# Patient Record
Sex: FEMALE | Race: ASIAN | NOT HISPANIC OR LATINO | URBAN - METROPOLITAN AREA
[De-identification: names, ages, dates, MRNs, and addresses within clinical notes are randomized per-mention and may not be internally consistent; named-entity substitution may affect disease eponyms.]

---

## 2017-02-06 ENCOUNTER — EMERGENCY (EMERGENCY)
Facility: HOSPITAL | Age: 42
LOS: 1 days | Discharge: HOME | End: 2017-02-06
Attending: EMERGENCY MEDICINE
Payer: COMMERCIAL

## 2017-02-06 VITALS
OXYGEN SATURATION: 99 % | HEIGHT: 64 IN | DIASTOLIC BLOOD PRESSURE: 74 MMHG | SYSTOLIC BLOOD PRESSURE: 169 MMHG | TEMPERATURE: 98 F | WEIGHT: 167.99 LBS | RESPIRATION RATE: 18 BRPM | HEART RATE: 62 BPM

## 2017-02-06 DIAGNOSIS — R07.9 CHEST PAIN, UNSPECIFIED: ICD-10-CM

## 2017-02-06 LAB
ANION GAP SERPL CALC-SCNC: 9 MMOL/L — SIGNIFICANT CHANGE UP (ref 5–17)
BASOPHILS # BLD AUTO: 0.1 K/UL — SIGNIFICANT CHANGE UP (ref 0–0.2)
BASOPHILS NFR BLD AUTO: 1.2 % — SIGNIFICANT CHANGE UP (ref 0–2)
BUN SERPL-MCNC: 10 MG/DL — SIGNIFICANT CHANGE UP (ref 7–18)
CALCIUM SERPL-MCNC: 8.6 MG/DL — SIGNIFICANT CHANGE UP (ref 8.4–10.5)
CHLORIDE SERPL-SCNC: 106 MMOL/L — SIGNIFICANT CHANGE UP (ref 96–108)
CK MB CFR SERPL CALC: <1 NG/ML — SIGNIFICANT CHANGE UP (ref 0–3.6)
CO2 SERPL-SCNC: 25 MMOL/L — SIGNIFICANT CHANGE UP (ref 22–31)
CREAT SERPL-MCNC: 0.8 MG/DL — SIGNIFICANT CHANGE UP (ref 0.5–1.3)
EOSINOPHIL # BLD AUTO: 0.1 K/UL — SIGNIFICANT CHANGE UP (ref 0–0.5)
EOSINOPHIL NFR BLD AUTO: 1.7 % — SIGNIFICANT CHANGE UP (ref 0–6)
GLUCOSE SERPL-MCNC: 80 MG/DL — SIGNIFICANT CHANGE UP (ref 70–99)
HCT VFR BLD CALC: 44.6 % — SIGNIFICANT CHANGE UP (ref 34.5–45)
HGB BLD-MCNC: 14.9 G/DL — SIGNIFICANT CHANGE UP (ref 11.5–15.5)
LYMPHOCYTES # BLD AUTO: 2.1 K/UL — SIGNIFICANT CHANGE UP (ref 1–3.3)
LYMPHOCYTES # BLD AUTO: 33.1 % — SIGNIFICANT CHANGE UP (ref 13–44)
MCHC RBC-ENTMCNC: 29.4 PG — SIGNIFICANT CHANGE UP (ref 27–34)
MCHC RBC-ENTMCNC: 33.3 GM/DL — SIGNIFICANT CHANGE UP (ref 32–36)
MCV RBC AUTO: 88.1 FL — SIGNIFICANT CHANGE UP (ref 80–100)
MONOCYTES # BLD AUTO: 0.3 K/UL — SIGNIFICANT CHANGE UP (ref 0–0.9)
MONOCYTES NFR BLD AUTO: 5.2 % — SIGNIFICANT CHANGE UP (ref 2–14)
NEUTROPHILS # BLD AUTO: 3.8 K/UL — SIGNIFICANT CHANGE UP (ref 1.8–7.4)
NEUTROPHILS NFR BLD AUTO: 58.7 % — SIGNIFICANT CHANGE UP (ref 43–77)
PLATELET # BLD AUTO: 305 K/UL — SIGNIFICANT CHANGE UP (ref 150–400)
POTASSIUM SERPL-MCNC: 4.4 MMOL/L — SIGNIFICANT CHANGE UP (ref 3.5–5.3)
POTASSIUM SERPL-SCNC: 4.4 MMOL/L — SIGNIFICANT CHANGE UP (ref 3.5–5.3)
RBC # BLD: 5.06 M/UL — SIGNIFICANT CHANGE UP (ref 3.8–5.2)
RBC # FLD: 11 % — SIGNIFICANT CHANGE UP (ref 10.3–14.5)
SODIUM SERPL-SCNC: 140 MMOL/L — SIGNIFICANT CHANGE UP (ref 135–145)
TROPONIN I SERPL-MCNC: <0.015 NG/ML — SIGNIFICANT CHANGE UP (ref 0–0.04)
WBC # BLD: 6.4 K/UL — SIGNIFICANT CHANGE UP (ref 3.8–10.5)
WBC # FLD AUTO: 6.4 K/UL — SIGNIFICANT CHANGE UP (ref 3.8–10.5)

## 2017-02-06 PROCEDURE — 99285 EMERGENCY DEPT VISIT HI MDM: CPT

## 2017-02-06 PROCEDURE — 71020: CPT | Mod: 26

## 2017-02-06 NOTE — ED PROVIDER NOTE - NS ED MD SCRIBE ATTENDING SCRIBE SECTIONS
HIV/HISTORY OF PRESENT ILLNESS/REVIEW OF SYSTEMS/VITAL SIGNS( Pullset)/PHYSICAL EXAM/DISPOSITION/PAST MEDICAL/SURGICAL/SOCIAL HISTORY

## 2017-02-06 NOTE — ED PROVIDER NOTE - OBJECTIVE STATEMENT
42 y/o F pt w/ no known PMHx p/w intermittent chest pain (center of chest) x1 week, no exertional component, today radiating to right shoulder, no aggravating or relieving factors. Pt denies diaphoresis, nausea, vomiting, shortness of breath, or any other complaints.

## 2021-08-06 PROBLEM — Z00.00 ENCOUNTER FOR PREVENTIVE HEALTH EXAMINATION: Status: ACTIVE | Noted: 2021-08-06

## 2021-08-09 PROBLEM — Z80.0 FAMILY HISTORY OF MALIGNANT NEOPLASM OF COLON: Status: ACTIVE | Noted: 2021-08-09

## 2021-08-09 NOTE — OB HISTORY
[Total Preg ___] : : [unfilled] [Full Term ___] : [unfilled] (full-term) [AB Induced ___] : [unfilled] elective (s) [AB Spont ___] : [unfilled] miscarriage(s)

## 2021-08-13 ENCOUNTER — APPOINTMENT (OUTPATIENT)
Dept: GYNECOLOGIC ONCOLOGY | Facility: CLINIC | Age: 46
End: 2021-08-13
Payer: COMMERCIAL

## 2021-08-13 VITALS
DIASTOLIC BLOOD PRESSURE: 78 MMHG | HEART RATE: 76 BPM | HEIGHT: 64 IN | WEIGHT: 174 LBS | BODY MASS INDEX: 29.71 KG/M2 | SYSTOLIC BLOOD PRESSURE: 110 MMHG

## 2021-08-13 DIAGNOSIS — Z72.89 OTHER PROBLEMS RELATED TO LIFESTYLE: ICD-10-CM

## 2021-08-13 DIAGNOSIS — Z80.0 FAMILY HISTORY OF MALIGNANT NEOPLASM OF DIGESTIVE ORGANS: ICD-10-CM

## 2021-08-13 DIAGNOSIS — Z80.7 FAMILY HISTORY OF OTHER MALIGNANT NEOPLASMS OF LYMPHOID, HEMATOPOIETIC AND RELATED TISSUES: ICD-10-CM

## 2021-08-13 PROCEDURE — 99205 OFFICE O/P NEW HI 60 MIN: CPT

## 2021-08-13 RX ORDER — TELMISARTAN 80 MG/1
80 TABLET ORAL
Refills: 0 | Status: ACTIVE | COMMUNITY

## 2021-08-13 RX ORDER — HYDROCHLOROTHIAZIDE 25 MG/1
25 TABLET ORAL
Refills: 0 | Status: ACTIVE | COMMUNITY

## 2021-08-14 NOTE — LETTER BODY
[Dear  ___] : Dear  [unfilled], [FreeTextEntry2] : I had the pleasure of seeing Dionicio Sanon in consultation regarding enlargement of the uterus and adnexal masses. We discussed the differential and management options, including my advise for excision and possible staging. \par \par I have advised further imaging and plan further discussion following this study.\par \par She may wish to defer intervention until after the son of her wedding. \par \par I will keep you informed as to her findings and disposition.

## 2021-08-14 NOTE — PHYSICAL EXAM
[Normal] : Recto-Vaginal Exam: Normal [Fully active, able to carry on all pre-disease performance without restriction] : Status 0 - Fully active, able to carry on all pre-disease performance without restriction [Abnormal] : Bimanual Exam: Abnormal [de-identified] : trace edema [de-identified] : The uterus was enlarged to about 14+ weeks. nonpalpable [de-identified] : The adnexa were nonpalpable separate from the uterus [de-identified] : The uterus was enlarged

## 2021-08-14 NOTE — HISTORY OF PRESENT ILLNESS
[FreeTextEntry1] : Referring GYN - Dr. Mouna Quan\par PCP - Dr. Emmy Will\par Bariatric - Dr. Pablo Daly\par \par Ms. Hernandez is a 45 yo  perimenopausal female (LMP 21) who presents for initial consultation at the request of Dr. Quan for the management of adnexal masses. She was seen for GYN exam 3/8/21 at which time she reported abdominal bloating and LLQ pain. In office imaging revealed fibroids and was to repeat imaging in a few months. Repeat imaging revealed uterine fibroids and possible bilateral endometriomas. She was referred here for further management. \par \par Reports intermittent LLQ pain and dyspareunia. No menorrhagia. Tolerating PO without N/V. Denies a change in appetite or weight. Reports normal bowel and urinary function. No other associated signs or symptoms. \par \par IMAGING:\par Pelvic MRI on 21 ([facility]): AV uterus 10 x 5 x 6 cm. No endometrial thickening or pathologic fluid. No adenomyosis. Several uterine fibroids: 1.9 x 1.8 right anterolateral, 1.9 x 1.6 cm right anterior, 1.2 x 0.6cm posterior. Additional subcm intramural fibroids are noted. No endometriosis. Small nabothian cysts. \par RTO - mildly enlarged. A unilocular cyst 2.9 x 3.3 x 2.1 cm. There is a small focal mural nodule or nodular excrescences along the posterior wall possible related to a blood clot in the setting of hemorrhagic cyst or endometrioma. \par LTO - mildly enlarged 3.3 x 3.9 x 2.5cm unilocular left ovarian cyst with dependent fluid levels posteriorly. Questionable small nondependent 3mm mural nodule anteriorly - could be a hemorrhagic cyst or endometrioma. no LAD. Trace pelvic ascites. \par \par In office TVUS on 21: uterus 6.9 x 7.0 x 4.6cm. Fibroids - 2.5 x 2.1 x 1.6 cm. There is a 2.8 x 2.5 x 2.3cm. There is a 2.5 x 2.4 x 2.3cm. There is a 2.4 x 2.1 x 1.6cm. \par RTO - 3.0 x 3.0 x 2.6cm. Hemorrhagic cyst cannot R/O dermoid\par \par PMHx: HTN\par PSHx: gastric balloon, 2021 - 30 lb weight loss to date - will be removed 2022\par Fam Hx: paternal aunt (colon cancer), father (lymphoma)\par \par \par HCM:\par PAP on 3/8/21: NEGATIVE\par Mammogram: 2021\par SBE: performs\par COVID-19 vaccine series completed 2021, Pfizer

## 2021-08-14 NOTE — DISCUSSION/SUMMARY
[Reviewed Clinical Lab Test(s)] : Results of clinical tests were reviewed. [Reviewed Radiology Report(s)] : Radiology reports were reviewed. [FreeTextEntry1] : -I met with the pt.\par -We discussed the clinical findings and differential (ut and and findings). MR report disc in detail. She will drop off the CDs for uploading. \par -Management options were reviewed, including my advise for reviewing the MR and obtaining CT abd/chest to exclude extrapelvic disease. We disc the poss dispo - excision which I advise (hy, BS, poss ov cystectomy vs oophorectomy, possible staging). Favor open proc (vert inc). Disc the role of exp mgmt with short interval f/u (Sept (given her son's planned wedding in Oct 2021) with Liliana. \par -Periop and recuperative issues were discussed, including the hazards of surgery.\par -A diagram was drawn and a copy was provided. \par -I advised a clearance.\par -I advised a CT abd/chest. Rx's provided. Await Auth. \par -We disc the role of a  and that I did not feel it would alter our plan. We disc factors that are assoc with its elevation and the challenges of interpreting a nl value.\par -Her instructions were reviewed.\par -All Q/A.\par -We plan a TH after the CT is done to determine the dispo.

## 2021-08-19 ENCOUNTER — OUTPATIENT (OUTPATIENT)
Dept: OUTPATIENT SERVICES | Facility: HOSPITAL | Age: 46
LOS: 1 days | End: 2021-08-19
Payer: COMMERCIAL

## 2021-08-19 ENCOUNTER — APPOINTMENT (OUTPATIENT)
Dept: CT IMAGING | Facility: CLINIC | Age: 46
End: 2021-08-19
Payer: COMMERCIAL

## 2021-08-19 DIAGNOSIS — D21.9 BENIGN NEOPLASM OF CONNECTIVE AND OTHER SOFT TISSUE, UNSPECIFIED: ICD-10-CM

## 2021-08-19 PROCEDURE — 71260 CT THORAX DX C+: CPT

## 2021-08-19 PROCEDURE — 82565 ASSAY OF CREATININE: CPT

## 2021-08-19 PROCEDURE — 74160 CT ABDOMEN W/CONTRAST: CPT | Mod: 26

## 2021-08-19 PROCEDURE — 74160 CT ABDOMEN W/CONTRAST: CPT

## 2021-08-19 PROCEDURE — 71260 CT THORAX DX C+: CPT | Mod: 26

## 2021-08-23 ENCOUNTER — NON-APPOINTMENT (OUTPATIENT)
Age: 46
End: 2021-08-23

## 2021-08-30 ENCOUNTER — APPOINTMENT (OUTPATIENT)
Dept: GYNECOLOGIC ONCOLOGY | Facility: CLINIC | Age: 46
End: 2021-08-30

## 2021-09-14 ENCOUNTER — NON-APPOINTMENT (OUTPATIENT)
Age: 46
End: 2021-09-14

## 2021-09-27 PROBLEM — R10.2 PELVIC PAIN IN FEMALE: Status: ACTIVE | Noted: 2021-08-13

## 2021-10-04 ENCOUNTER — APPOINTMENT (OUTPATIENT)
Dept: GYNECOLOGIC ONCOLOGY | Facility: CLINIC | Age: 46
End: 2021-10-04
Payer: COMMERCIAL

## 2021-10-04 DIAGNOSIS — R10.2 PELVIC AND PERINEAL PAIN: ICD-10-CM

## 2021-10-04 PROCEDURE — 99214 OFFICE O/P EST MOD 30 MIN: CPT

## 2021-11-08 ENCOUNTER — APPOINTMENT (OUTPATIENT)
Dept: ULTRASOUND IMAGING | Facility: CLINIC | Age: 46
End: 2021-11-08
Payer: COMMERCIAL

## 2021-11-08 ENCOUNTER — OUTPATIENT (OUTPATIENT)
Dept: OUTPATIENT SERVICES | Facility: HOSPITAL | Age: 46
LOS: 1 days | End: 2021-11-08
Payer: COMMERCIAL

## 2021-11-08 DIAGNOSIS — D21.9 BENIGN NEOPLASM OF CONNECTIVE AND OTHER SOFT TISSUE, UNSPECIFIED: ICD-10-CM

## 2021-11-08 DIAGNOSIS — R19.00 INTRA-ABDOMINAL AND PELVIC SWELLING, MASS AND LUMP, UNSPECIFIED SITE: ICD-10-CM

## 2021-11-08 PROCEDURE — 76830 TRANSVAGINAL US NON-OB: CPT

## 2021-11-08 PROCEDURE — 76830 TRANSVAGINAL US NON-OB: CPT | Mod: 26

## 2021-11-22 ENCOUNTER — NON-APPOINTMENT (OUTPATIENT)
Age: 46
End: 2021-11-22

## 2021-11-29 PROBLEM — R19.00 PELVIC MASS: Status: ACTIVE | Noted: 2021-08-09

## 2021-12-03 ENCOUNTER — APPOINTMENT (OUTPATIENT)
Dept: GYNECOLOGIC ONCOLOGY | Facility: CLINIC | Age: 46
End: 2021-12-03
Payer: COMMERCIAL

## 2021-12-03 DIAGNOSIS — D21.9 BENIGN NEOPLASM OF CONNECTIVE AND OTHER SOFT TISSUE, UNSPECIFIED: ICD-10-CM

## 2021-12-03 DIAGNOSIS — R19.00 INTRA-ABDOMINAL AND PELVIC SWELLING, MASS AND LUMP, UNSPECIFIED SITE: ICD-10-CM

## 2021-12-03 DIAGNOSIS — I10 ESSENTIAL (PRIMARY) HYPERTENSION: ICD-10-CM

## 2021-12-03 PROCEDURE — 99212 OFFICE O/P EST SF 10 MIN: CPT | Mod: 95

## 2022-11-04 ENCOUNTER — RESULT REVIEW (OUTPATIENT)
Age: 47
End: 2022-11-04

## 2024-11-21 ENCOUNTER — NON-APPOINTMENT (OUTPATIENT)
Age: 49
End: 2024-11-21

## 2024-11-21 ENCOUNTER — APPOINTMENT (OUTPATIENT)
Dept: GYNECOLOGIC ONCOLOGY | Facility: CLINIC | Age: 49
End: 2024-11-21
Payer: COMMERCIAL

## 2024-11-21 VITALS
HEIGHT: 64 IN | DIASTOLIC BLOOD PRESSURE: 79 MMHG | WEIGHT: 170 LBS | RESPIRATION RATE: 16 BRPM | SYSTOLIC BLOOD PRESSURE: 132 MMHG | TEMPERATURE: 97.8 F | HEART RATE: 92 BPM | OXYGEN SATURATION: 97 % | BODY MASS INDEX: 29.02 KG/M2

## 2024-11-21 DIAGNOSIS — N83.299 OTHER OVARIAN CYST, UNSPECIFIED SIDE: ICD-10-CM

## 2024-11-21 PROCEDURE — 99459 PELVIC EXAMINATION: CPT

## 2024-11-21 PROCEDURE — 99214 OFFICE O/P EST MOD 30 MIN: CPT

## 2024-11-21 RX ORDER — TIRZEPATIDE 5 MG/.5ML
5 INJECTION, SOLUTION SUBCUTANEOUS
Refills: 0 | Status: ACTIVE | COMMUNITY

## 2024-11-22 LAB
CANCER AG125 SERPL-ACNC: 10 U/ML
CANCER AG19-9 SERPL-ACNC: <2 U/ML
CEA SERPL-MCNC: 2.5 NG/ML

## 2024-12-03 ENCOUNTER — APPOINTMENT (OUTPATIENT)
Dept: HEMATOLOGY ONCOLOGY | Facility: CLINIC | Age: 49
End: 2024-12-03

## 2024-12-10 ENCOUNTER — APPOINTMENT (OUTPATIENT)
Dept: HEMATOLOGY ONCOLOGY | Facility: CLINIC | Age: 49
End: 2024-12-10

## 2024-12-25 PROBLEM — F10.90 ALCOHOL USE: Status: ACTIVE | Noted: 2021-08-13

## 2024-12-27 ENCOUNTER — NON-APPOINTMENT (OUTPATIENT)
Age: 49
End: 2024-12-27

## 2025-01-16 ENCOUNTER — OUTPATIENT (OUTPATIENT)
Dept: OUTPATIENT SERVICES | Facility: HOSPITAL | Age: 50
LOS: 1 days | End: 2025-01-16

## 2025-01-16 VITALS
HEART RATE: 90 BPM | RESPIRATION RATE: 16 BRPM | DIASTOLIC BLOOD PRESSURE: 80 MMHG | OXYGEN SATURATION: 100 % | HEIGHT: 65 IN | SYSTOLIC BLOOD PRESSURE: 121 MMHG | WEIGHT: 171.96 LBS | TEMPERATURE: 98 F

## 2025-01-16 DIAGNOSIS — N83.299 OTHER OVARIAN CYST, UNSPECIFIED SIDE: ICD-10-CM

## 2025-01-16 DIAGNOSIS — Z98.890 OTHER SPECIFIED POSTPROCEDURAL STATES: Chronic | ICD-10-CM

## 2025-01-16 DIAGNOSIS — N83.209 UNSPECIFIED OVARIAN CYST, UNSPECIFIED SIDE: ICD-10-CM

## 2025-01-16 LAB
A1C WITH ESTIMATED AVERAGE GLUCOSE RESULT: 5.2 % — SIGNIFICANT CHANGE UP (ref 4–5.6)
ANION GAP SERPL CALC-SCNC: 15 MMOL/L — HIGH (ref 7–14)
APPEARANCE UR: CLEAR — SIGNIFICANT CHANGE UP
BILIRUB UR-MCNC: NEGATIVE — SIGNIFICANT CHANGE UP
BLD GP AB SCN SERPL QL: NEGATIVE — SIGNIFICANT CHANGE UP
BUN SERPL-MCNC: 19 MG/DL — SIGNIFICANT CHANGE UP (ref 7–23)
CALCIUM SERPL-MCNC: 9.6 MG/DL — SIGNIFICANT CHANGE UP (ref 8.4–10.5)
CHLORIDE SERPL-SCNC: 101 MMOL/L — SIGNIFICANT CHANGE UP (ref 98–107)
CO2 SERPL-SCNC: 26 MMOL/L — SIGNIFICANT CHANGE UP (ref 22–31)
COLOR SPEC: YELLOW — SIGNIFICANT CHANGE UP
CREAT SERPL-MCNC: 0.92 MG/DL — SIGNIFICANT CHANGE UP (ref 0.5–1.3)
DIFF PNL FLD: NEGATIVE — SIGNIFICANT CHANGE UP
EGFR: 75 ML/MIN/1.73M2 — SIGNIFICANT CHANGE UP
ESTIMATED AVERAGE GLUCOSE: 103 — SIGNIFICANT CHANGE UP
GLUCOSE SERPL-MCNC: 55 MG/DL — LOW (ref 70–99)
GLUCOSE UR QL: NEGATIVE MG/DL — SIGNIFICANT CHANGE UP
KETONES UR-MCNC: ABNORMAL MG/DL
LEUKOCYTE ESTERASE UR-ACNC: NEGATIVE — SIGNIFICANT CHANGE UP
NITRITE UR-MCNC: NEGATIVE — SIGNIFICANT CHANGE UP
PH UR: 5.5 — SIGNIFICANT CHANGE UP (ref 5–8)
POTASSIUM SERPL-MCNC: 3.3 MMOL/L — LOW (ref 3.5–5.3)
POTASSIUM SERPL-SCNC: 3.3 MMOL/L — LOW (ref 3.5–5.3)
PROT UR-MCNC: NEGATIVE MG/DL — SIGNIFICANT CHANGE UP
RH IG SCN BLD-IMP: POSITIVE — SIGNIFICANT CHANGE UP
RH IG SCN BLD-IMP: POSITIVE — SIGNIFICANT CHANGE UP
SODIUM SERPL-SCNC: 142 MMOL/L — SIGNIFICANT CHANGE UP (ref 135–145)
SP GR SPEC: 1.03 — SIGNIFICANT CHANGE UP (ref 1–1.03)
UROBILINOGEN FLD QL: 0.2 MG/DL — SIGNIFICANT CHANGE UP (ref 0.2–1)

## 2025-01-16 NOTE — H&P PST ADULT - NSWEIGHTCALCTOOLDRUG_GEN_A_CORE
Pt was here today for initial diabetes visit.  Current diabetes medications:  Lantus 15 units am, Metformin 500 mg bid.  Current glucose levels:  Yfizlqh-[id number]  Before morkgb-[id number]  Before bed-[id number]  Okay to titrate Metformin to 1000 mg bid?   Thanks!    used

## 2025-01-16 NOTE — H&P PST ADULT - NSICDXPASTMEDICALHX_GEN_ALL_CORE_FT
PAST MEDICAL HISTORY:  Obesity     Ovarian cyst      PAST MEDICAL HISTORY:  HTN (hypertension)     Obesity     Ovarian cyst

## 2025-01-16 NOTE — H&P PST ADULT - GENITOURINARY COMMENTS
Pt not examined Pt with hx ovarian cyst that has increased in size and is causing lower abdominal pain radiating to back - pt denies bleeding -

## 2025-01-16 NOTE — H&P PST ADULT - CARDIOVASCULAR COMMENTS
Pt  with hx HTN - Pt  with hx HTN - seen by Cardio for evaluation of HTN - "normal test results" - denies CP

## 2025-01-16 NOTE — H&P PST ADULT - NSICDXPASTSURGICALHX_GEN_ALL_CORE_FT
----- Message from Narinder Lockwood MD sent at 7/9/2018  4:08 PM EDT -----  Call  Wife  And  Let  Her  Know  Chest  Xray  negative PAST SURGICAL HISTORY:  H/O bilateral breast reduction surgery     H/O rotator cuff surgery

## 2025-01-16 NOTE — H&P PST ADULT - HISTORY OF PRESENT ILLNESS
Pt is a 49 yr old female scheduled for Robotic Right Salpingo oophorectomy and Left salpingectomy D&C Poss Left Oophorectomy Hysterectomy Staging Laparotomy Cysto with Dr Gil 1/31/25 - pt with right lower pelvic pain with a hx ovarian cysts - one noted to have increased in size - pt denies bleeding - Hx HTN , obesity

## 2025-01-17 LAB
BASOPHILS # BLD AUTO: 0.03 K/UL — SIGNIFICANT CHANGE UP (ref 0–0.2)
BASOPHILS NFR BLD AUTO: 0.7 % — SIGNIFICANT CHANGE UP (ref 0–2)
CULTURE RESULTS: SIGNIFICANT CHANGE UP
EOSINOPHIL # BLD AUTO: 0.13 K/UL — SIGNIFICANT CHANGE UP (ref 0–0.5)
EOSINOPHIL NFR BLD AUTO: 3.2 % — SIGNIFICANT CHANGE UP (ref 0–6)
HCT VFR BLD CALC: 40.1 % — SIGNIFICANT CHANGE UP (ref 34.5–45)
HGB BLD-MCNC: 14 G/DL — SIGNIFICANT CHANGE UP (ref 11.5–15.5)
IMM GRANULOCYTES NFR BLD AUTO: 0 % — SIGNIFICANT CHANGE UP (ref 0–0.9)
LYMPHOCYTES # BLD AUTO: 1.18 K/UL — SIGNIFICANT CHANGE UP (ref 1–3.3)
LYMPHOCYTES # BLD AUTO: 29.1 % — SIGNIFICANT CHANGE UP (ref 13–44)
MCHC RBC-ENTMCNC: 29.3 PG — SIGNIFICANT CHANGE UP (ref 27–34)
MCHC RBC-ENTMCNC: 34.9 G/DL — SIGNIFICANT CHANGE UP (ref 32–36)
MCV RBC AUTO: 83.9 FL — SIGNIFICANT CHANGE UP (ref 80–100)
MONOCYTES # BLD AUTO: 0.32 K/UL — SIGNIFICANT CHANGE UP (ref 0–0.9)
MONOCYTES NFR BLD AUTO: 7.9 % — SIGNIFICANT CHANGE UP (ref 2–14)
NEUTROPHILS # BLD AUTO: 2.4 K/UL — SIGNIFICANT CHANGE UP (ref 1.8–7.4)
NEUTROPHILS NFR BLD AUTO: 59.1 % — SIGNIFICANT CHANGE UP (ref 43–77)
PLATELET # BLD AUTO: 364 K/UL — SIGNIFICANT CHANGE UP (ref 150–400)
RBC # BLD: 4.78 M/UL — SIGNIFICANT CHANGE UP (ref 3.8–5.2)
RBC # FLD: 13.1 % — SIGNIFICANT CHANGE UP (ref 10.3–14.5)
SPECIMEN SOURCE: SIGNIFICANT CHANGE UP
WBC # BLD: 4.06 K/UL — SIGNIFICANT CHANGE UP (ref 3.8–10.5)
WBC # FLD AUTO: 4.06 K/UL — SIGNIFICANT CHANGE UP (ref 3.8–10.5)

## 2025-01-30 NOTE — ASU PATIENT PROFILE, ADULT - PRO MENTAL HEALTH SX RECENT
none Suturegard Intro: Intraoperative tissue expansion was performed, utilizing the SUTUREGARD device, in order to reduce wound tension.

## 2025-01-31 ENCOUNTER — TRANSCRIPTION ENCOUNTER (OUTPATIENT)
Age: 50
End: 2025-01-31

## 2025-01-31 ENCOUNTER — APPOINTMENT (OUTPATIENT)
Dept: GYNECOLOGIC ONCOLOGY | Facility: HOSPITAL | Age: 50
End: 2025-01-31

## 2025-01-31 ENCOUNTER — OUTPATIENT (OUTPATIENT)
Dept: OUTPATIENT SERVICES | Facility: HOSPITAL | Age: 50
LOS: 1 days | Discharge: HOME | End: 2025-01-31
Payer: COMMERCIAL

## 2025-01-31 ENCOUNTER — RESULT REVIEW (OUTPATIENT)
Age: 50
End: 2025-01-31

## 2025-01-31 VITALS
SYSTOLIC BLOOD PRESSURE: 111 MMHG | HEART RATE: 85 BPM | OXYGEN SATURATION: 95 % | DIASTOLIC BLOOD PRESSURE: 76 MMHG | HEIGHT: 65 IN | RESPIRATION RATE: 16 BRPM | TEMPERATURE: 98 F | WEIGHT: 171.96 LBS

## 2025-01-31 VITALS
DIASTOLIC BLOOD PRESSURE: 80 MMHG | HEART RATE: 75 BPM | RESPIRATION RATE: 16 BRPM | SYSTOLIC BLOOD PRESSURE: 125 MMHG | OXYGEN SATURATION: 100 %

## 2025-01-31 DIAGNOSIS — Z98.890 OTHER SPECIFIED POSTPROCEDURAL STATES: Chronic | ICD-10-CM

## 2025-01-31 DIAGNOSIS — N83.299 OTHER OVARIAN CYST, UNSPECIFIED SIDE: ICD-10-CM

## 2025-01-31 LAB — GLUCOSE BLDC GLUCOMTR-MCNC: 101 MG/DL — HIGH (ref 70–99)

## 2025-01-31 PROCEDURE — 58661 LAPAROSCOPY REMOVE ADNEXA: CPT | Mod: 50

## 2025-01-31 PROCEDURE — 88302 TISSUE EXAM BY PATHOLOGIST: CPT | Mod: 26

## 2025-01-31 PROCEDURE — 88305 TISSUE EXAM BY PATHOLOGIST: CPT | Mod: 26

## 2025-01-31 PROCEDURE — S2900 ROBOTIC SURGICAL SYSTEM: CPT

## 2025-01-31 PROCEDURE — 88112 CYTOPATH CELL ENHANCE TECH: CPT | Mod: 26

## 2025-01-31 PROCEDURE — 88342 IMHCHEM/IMCYTCHM 1ST ANTB: CPT | Mod: 26

## 2025-01-31 PROCEDURE — 58120 DILATION AND CURETTAGE: CPT

## 2025-01-31 PROCEDURE — 58661 LAPAROSCOPY REMOVE ADNEXA: CPT | Mod: AS,50

## 2025-01-31 PROCEDURE — 88305 TISSUE EXAM BY PATHOLOGIST: CPT | Mod: 26,59

## 2025-01-31 RX ORDER — TELMISARTAN AND HYDROCHLOROTHIAZIDE 80; 25 MG/1; MG/1
1 TABLET ORAL
Refills: 0 | DISCHARGE

## 2025-01-31 RX ORDER — OXYCODONE HYDROCHLORIDE 30 MG/1
1 TABLET ORAL
Qty: 6 | Refills: 0
Start: 2025-01-31

## 2025-01-31 RX ORDER — OXYCODONE HYDROCHLORIDE 30 MG/1
5 TABLET ORAL ONCE
Refills: 0 | Status: DISCONTINUED | OUTPATIENT
Start: 2025-01-31 | End: 2025-01-31

## 2025-01-31 RX ORDER — SODIUM CHLORIDE 9 G/ML
1000 INJECTION, SOLUTION INTRAVENOUS
Refills: 0 | Status: ACTIVE | OUTPATIENT
Start: 2025-01-31 | End: 2025-12-30

## 2025-01-31 RX ORDER — HYDROMORPHONE HYDROCHLORIDE 4 MG/ML
0.5 INJECTION, SOLUTION INTRAMUSCULAR; INTRAVENOUS; SUBCUTANEOUS
Refills: 0 | Status: DISCONTINUED | OUTPATIENT
Start: 2025-01-31 | End: 2025-01-31

## 2025-01-31 RX ORDER — ANTISEPTIC SURGICAL SCRUB 0.04 MG/ML
1 SOLUTION TOPICAL ONCE
Refills: 0 | Status: ACTIVE | OUTPATIENT
Start: 2025-01-31 | End: 2025-01-31

## 2025-01-31 RX ORDER — ONDANSETRON 4 MG/1
4 TABLET, ORALLY DISINTEGRATING ORAL ONCE
Refills: 0 | Status: ACTIVE | OUTPATIENT
Start: 2025-01-31 | End: 2025-12-30

## 2025-01-31 RX ORDER — SODIUM CHLORIDE 9 G/ML
1000 INJECTION, SOLUTION INTRAVENOUS
Refills: 0 | Status: DISCONTINUED | OUTPATIENT
Start: 2025-01-31 | End: 2025-01-31

## 2025-01-31 RX ORDER — IBUPROFEN 600 MG/1
1 TABLET, FILM COATED ORAL
Qty: 0 | Refills: 0 | DISCHARGE

## 2025-01-31 RX ORDER — ACETAMINOPHEN 160 MG/5ML
3 SUSPENSION ORAL
Qty: 0 | Refills: 0 | DISCHARGE

## 2025-01-31 RX ADMIN — OXYCODONE HYDROCHLORIDE 5 MILLIGRAM(S): 30 TABLET ORAL at 13:03

## 2025-01-31 RX ADMIN — OXYCODONE HYDROCHLORIDE 5 MILLIGRAM(S): 30 TABLET ORAL at 13:35

## 2025-01-31 NOTE — ASU DISCHARGE PLAN (ADULT/PEDIATRIC) - CARE PROVIDER_API CALL
Ena Gil  Gynecologic Oncology  [address] [place] [address]  Phone: [phone]  Fax: [phone]  Follow Up Time: 2 weeks

## 2025-01-31 NOTE — ASU DISCHARGE PLAN (ADULT/PEDIATRIC) - FINANCIAL ASSISTANCE
[facility] provides services at a reduced cost to those who are determined to be eligible through [facility]’s financial assistance program. Information regarding [facility]’s financial assistance program can be found by going to [email or web address] or by calling [phone].

## 2025-01-31 NOTE — ASU DISCHARGE PLAN (ADULT/PEDIATRIC) - NURSING INSTRUCTIONS
Pt has post-operative appointment with Dr. Gil on 2/13 @ 11:30am Pt has post-operative appointment with Dr. Gil on 2/13 @ 11:30am    You received IV Tylenol for pain management at 8:15am. Please DO NOT take any Tylenol (Acetaminophen) containing products, such as Vicodin, Percocet, Excedrin, and cold medications for the next 6 hours (until 2:15 PM). DO NOT TAKE MORE THAN 4000 MG OF TYLENOL in a 24 hour period.

## 2025-01-31 NOTE — ASU DISCHARGE PLAN (ADULT/PEDIATRIC) - CALL YOUR DOCTOR IF YOU HAVE ANY OF THE FOLLOWING:
Bleeding that does not stop/Fever greater than (need to indicate Fahrenheit or Celsius) Bleeding that does not stop/Pain not relieved by Medications/Fever greater than (need to indicate Fahrenheit or Celsius)/Wound/Surgical Site with redness, or foul smelling discharge or pus/Nausea and vomiting that does not stop/Unable to urinate/Excessive diarrhea

## 2025-01-31 NOTE — ASU DISCHARGE PLAN (ADULT/PEDIATRIC) - ASU DC SPECIAL INSTRUCTIONSFT
Postoperative Instructions    For pain control, take the followin. Ibuprofen 600mg every 6 hours, take with food  2. Add Tylenol 975mg every 6 hours, alternated with ibuprofen  Tylenol and ibuprofen may be obtained over the counter.  3. Oxycodone 5mg every 6 hours as needed for severe pain. A prescription has been sent to your pharmacy.    Return to your regular way of eating.     Resume normal activity as tolerated, but no heavy lifting or strenuous activity for 6 weeks. Complete vaginal rest, no tampons, no douching, no tub bathing, no sexual activities for 6 weeks unless otherwise instructed by your doctor.      No driving while on narcotic pain medication.      Call your doctor with any signs and symptoms of infection such as fever (>100.4 F), chills, nausea or vomiting.  Call your doctor if you're unable to tolerate food or have difficulty urinating.  Call your doctor if you have pain that is not relieved by your prescribed medications. Call your doctor with redness or swelling at the incision site, fluid leakage or wound separation. Do not remove steri strips- they will be removed at postoperative appointment.     Notify your doctor with any other concerns. Follow up with your doctor in 2 weeks for a post-operative appointment. Postoperative Instructions    For pain control, take the followin. Ibuprofen 600mg every 6 hours, take with food  2. Add Tylenol 975mg every 6 hours, alternated with ibuprofen  Tylenol and ibuprofen may be obtained over the counter.  3. Oxycodone 5mg every 6 hours as needed for severe pain. A prescription has been sent to your pharmacy.    Return to your regular way of eating.     Resume normal activity as tolerated, but no heavy lifting or strenuous activity for 6 weeks. Complete vaginal rest, no tampons, no douching, no sexual activities for 6 weeks unless otherwise instructed by your doctor.      No driving while on narcotic pain medication.      Call your doctor with any signs and symptoms of infection such as fever (>100.4 F), chills, nausea or vomiting.  Call your doctor if you're unable to tolerate food or have difficulty urinating.  Call your doctor if you have pain that is not relieved by your prescribed medications. Call your doctor with redness or swelling at the incision site, fluid leakage or wound separation. Do not remove steri strips- they will be removed at postoperative appointment.     Notify your doctor with any other concerns. Follow up with your doctor in 2 weeks for a post-operative appointment.

## 2025-01-31 NOTE — ASU DISCHARGE PLAN (ADULT/PEDIATRIC) - FOLLOW UP APPOINTMENTS
[facility] may also call Recovery Room (PACU) 24/7 @ [phone]/[facility]

## 2025-01-31 NOTE — ASU DISCHARGE PLAN (ADULT/PEDIATRIC) - PROCEDURE
dilation and curettage, left salpingectomy, right salpingo-oophorectomy dilation and curettage, robotic left salpingectomy, right salpingo-oophorectomy

## 2025-01-31 NOTE — CHART NOTE - NSCHARTNOTEFT_GEN_A_CORE
PA POST OP NOTE:       SUBJECTIVE:  Patient seen and examined in recliner. Patient is awake and reports cramping pain at this time. Denies c/o nausea, vomiting, sob, cp or palpitations. Dennise sips of water. Still DTV.    Vital Signs Last 24 Hrs  T(C): 36.2 (31 Jan 2025 12:00), Max: 36.4 (31 Jan 2025 05:56)  T(F): 97.2 (31 Jan 2025 12:00), Max: 97.5 (31 Jan 2025 05:56)  HR: 86 (31 Jan 2025 12:30) (75 - 88)  BP: 114/71 (31 Jan 2025 12:30) (105/74 - 125/78)  BP(mean): 84 (31 Jan 2025 12:30) (64 - 90)  RR: 20 (31 Jan 2025 12:30) (10 - 22)  SpO2: 96% (31 Jan 2025 12:30) (95% - 100%)    Parameters below as of 31 Jan 2025 12:30  Patient On (Oxygen Delivery Method): room air          PHYSICAL EXAM:    LUNGS: Clear to auscultation bilaterally; No wheezing.  HEART: Regular, rate and rhythm; No murmurs.  ABDOMEN: Soft, + BS, nondistended and appropriately tender on palpation.  INCISION:  Scope sites intact. Dressings clean and dry.   EXTREMITIES: No swelling or calf tenderness bilaterally.   BEE:  Removed in OR.    MEDICATIONS  (STANDING):  chlorhexidine 2% Cloths 1 Application(s) Topical once  lactated ringers. 1000 milliLiter(s) (125 mL/Hr) IV Continuous <Continuous>    MEDICATIONS  (PRN):  HYDROmorphone  Injectable 0.5 milliGRAM(s) IV Push every 10 minutes PRN Moderate Pain (4 - 6)  ondansetron Injectable 4 milliGRAM(s) IV Push once PRN Nausea and/or Vomiting      ASSESMENT/PLAN: 50y/o POD#0  from Robotic Lsc RSO, LS and D+C for Complex Right Ovarian Cyst in stable condition. EBL:10ml    1.Clears to Regular diet as tolerate.  2. IVF: RL @125cc/hr.  3. Activity: Out of bed with assist.  4. Vital Signs Q routine.  5. Pulm: pulse Ox monitoring and encourage incentive spirometer use.  6. Pain meds as ordered.  7. DTV by 7PM.  8. Evaluate for discharge home when tolerates diet, voids, ambulates and vss.

## 2025-01-31 NOTE — ASU DISCHARGE PLAN (ADULT/PEDIATRIC) - ACTIVITY LEVEL
Nothing per rectum/Nothing per vagina/No tub baths/No douching/No tampons/No intercourse No heavy lifting/No sports/gym/Nothing per vagina/No tub baths/No douching/No tampons/No intercourse

## 2025-01-31 NOTE — BRIEF OPERATIVE NOTE - COMMENTS
I, Christal Maya PA-C, served as the first assistant in this operation. I assisted in placing ports, docking, and targeting the da Rosemarie robot, first assisted at the surgical field while the surgeon was performing the operation at the robotic console by providing instrument exchanges, tissue retraction, suction and irrigation, specimen retrieval, passing and removing sutures and sponges, undocking the robotic platform, and closed surgical wounds.

## 2025-01-31 NOTE — BRIEF OPERATIVE NOTE - NSICDXBRIEFPROCEDURE_GEN_ALL_CORE_FT
PROCEDURES:  Robot-assisted right salpingo-oophorectomy 31-Jan-2025 11:05:25  Jessica Jamil  Laparoscopic left salpingectomy 31-Jan-2025 11:06:03  Jessica Jamil  Dilation and curettage, uterus 31-Jan-2025 11:06:12  Jessica Jamil

## 2025-01-31 NOTE — BRIEF OPERATIVE NOTE - OPERATION/FINDINGS
Exam under anesthesia:  Normal external genitalia. Vagina and cervix appeared normal.     Intraabdominal findings: grossly normal left ovary and bilateral fallopian tubes. Right ovary noted to have a 3cm cyst. Uterus non-enlarged and smooth with a 3cm anterior fibroid. Normal upper abdominal survey with smooth liver edge. Appendix visualized/normal, normal omentum.

## 2025-02-03 ENCOUNTER — NON-APPOINTMENT (OUTPATIENT)
Age: 50
End: 2025-02-03

## 2025-02-04 LAB — NON-GYNECOLOGICAL CYTOLOGY STUDY: SIGNIFICANT CHANGE UP

## 2025-02-13 ENCOUNTER — NON-APPOINTMENT (OUTPATIENT)
Age: 50
End: 2025-02-13

## 2025-02-13 ENCOUNTER — APPOINTMENT (OUTPATIENT)
Dept: GYNECOLOGIC ONCOLOGY | Facility: CLINIC | Age: 50
End: 2025-02-13
Payer: COMMERCIAL

## 2025-02-13 VITALS
DIASTOLIC BLOOD PRESSURE: 78 MMHG | TEMPERATURE: 97.7 F | BODY MASS INDEX: 30.73 KG/M2 | SYSTOLIC BLOOD PRESSURE: 115 MMHG | OXYGEN SATURATION: 95 % | HEART RATE: 87 BPM | WEIGHT: 180 LBS | HEIGHT: 64 IN

## 2025-02-13 DIAGNOSIS — N83.299 OTHER OVARIAN CYST, UNSPECIFIED SIDE: ICD-10-CM

## 2025-02-13 PROCEDURE — 99212 OFFICE O/P EST SF 10 MIN: CPT

## 2025-02-14 LAB — SURGICAL PATHOLOGY STUDY: SIGNIFICANT CHANGE UP

## (undated) DEVICE — ELCTR BOVIE PENCIL SMOKE EVACUATION

## (undated) DEVICE — XI DRAPE COLUMN

## (undated) DEVICE — XI ARM FORCEP PROGRASP 8MM

## (undated) DEVICE — SOL IRR POUR NS 0.9% 500ML

## (undated) DEVICE — XI ARM NEEDLE DRIVER SUTURECUT MEGA 8MM

## (undated) DEVICE — UTERINE MANIPULATOR CONMED VCARE MED 34MM

## (undated) DEVICE — XI ARM NEEDLE DRIVER LARGE

## (undated) DEVICE — WARMING BLANKET UPPER ADULT

## (undated) DEVICE — SUT POLYSORB 0 60" TIES UNDYED

## (undated) DEVICE — TUBING IRR SET FOR CYSTOSCOPY 77"

## (undated) DEVICE — FOLEY TRAY 16FR 5CC LTX UMETER CLOSED

## (undated) DEVICE — NDL COUNTER FOAM AND MAGNET 10-20

## (undated) DEVICE — XI ARM FORCEP FENESTRATED BIPOLAR 8MM

## (undated) DEVICE — POSITIONER FOAM HEAD CRADLE (PINK)

## (undated) DEVICE — GLV 6 PROTEXIS (WHITE)

## (undated) DEVICE — LAPSAC SURGICAL TISSUE POUCH 8X10"

## (undated) DEVICE — DRAPE MAYO STAND 23"

## (undated) DEVICE — PREP BETADINE KIT

## (undated) DEVICE — POSITIONER PURPLE ARM ONE STEP (LARGE)

## (undated) DEVICE — GLV 6.5 PROTEXIS (BLUE)

## (undated) DEVICE — NDL HYPO REGULAR BEVEL 25G X 1.5" (BLUE)

## (undated) DEVICE — TUBING STRYKEFLOW II SUCTION / IRRIGATOR

## (undated) DEVICE — VENODYNE/SCD SLEEVE CALF MEDIUM

## (undated) DEVICE — XI SEAL UNIVERSIAL 5-12MM

## (undated) DEVICE — POSITIONER PINK PAD PIGAZZI SYSTEM

## (undated) DEVICE — XI SCISSOR TIP COVER

## (undated) DEVICE — DRAPE WARMING SOLUTION 44 X 44"

## (undated) DEVICE — ELCTR GROUNDING PAD ADULT COVIDIEN

## (undated) DEVICE — UTERINE MANIPULATOR CONMED VCARE LG 37MM

## (undated) DEVICE — SYR ASEPTO

## (undated) DEVICE — UTERINE MANIPULATOR COOPER SURGICAL 5MM 33CM GREEN

## (undated) DEVICE — GOWN XL

## (undated) DEVICE — SUT MONOCRYL 4-0 27" PS-2 UNDYED

## (undated) DEVICE — XI DRAPE ARM

## (undated) DEVICE — TUBING AIRSEAL TRI-LUMEN FILTERED

## (undated) DEVICE — ENDOCATCH 10MM

## (undated) DEVICE — PACK PERI GYN

## (undated) DEVICE — SUT VICRYL 0 27" UR-6

## (undated) DEVICE — XI ARM SCISSOR MONO CURVED

## (undated) DEVICE — SUT VICRYL PLUS 0 27" CT-3

## (undated) DEVICE — PACK ROBOTIC LIJ

## (undated) DEVICE — DRSG MASTISOL

## (undated) DEVICE — UTERINE MANIPULATOR CONMED VCARE SM 32MM

## (undated) DEVICE — LUBRICATING JELLY ONESHOT 1.25OZ

## (undated) DEVICE — SPECIMEN CONTAINER 100ML

## (undated) DEVICE — D HELP - CLEARVIEW CLEARIFY SYSTEM

## (undated) DEVICE — PACK D&C

## (undated) DEVICE — XI OBTURATOR OPTICAL BLADELESS 8MM